# Patient Record
Sex: FEMALE | Race: OTHER | URBAN - METROPOLITAN AREA
[De-identification: names, ages, dates, MRNs, and addresses within clinical notes are randomized per-mention and may not be internally consistent; named-entity substitution may affect disease eponyms.]

---

## 2022-10-18 ENCOUNTER — HOSPITAL ENCOUNTER (OUTPATIENT)
Dept: LAB | Age: 22
Discharge: HOME | End: 2022-10-18

## 2022-10-18 ENCOUNTER — OFFICE VISIT (OUTPATIENT)
Dept: FAMILY MEDICINE CLINIC | Age: 22
End: 2022-10-18

## 2022-10-18 VITALS
HEIGHT: 59 IN | WEIGHT: 136 LBS | BODY MASS INDEX: 27.42 KG/M2 | DIASTOLIC BLOOD PRESSURE: 70 MMHG | TEMPERATURE: 98.8 F | OXYGEN SATURATION: 99 % | HEART RATE: 63 BPM | SYSTOLIC BLOOD PRESSURE: 116 MMHG

## 2022-10-18 DIAGNOSIS — R53.83 OTHER FATIGUE: ICD-10-CM

## 2022-10-18 DIAGNOSIS — R53.83 OTHER FATIGUE: Primary | ICD-10-CM

## 2022-10-18 LAB — HGB BLD-MCNC: 13.9 G/DL

## 2022-10-18 PROCEDURE — 80053 COMPREHEN METABOLIC PANEL: CPT

## 2022-10-18 PROCEDURE — 85018 HEMOGLOBIN: CPT | Performed by: NURSE PRACTITIONER

## 2022-10-18 PROCEDURE — 84703 CHORIONIC GONADOTROPIN ASSAY: CPT

## 2022-10-18 PROCEDURE — 36415 COLL VENOUS BLD VENIPUNCTURE: CPT

## 2022-10-18 PROCEDURE — 85025 COMPLETE CBC W/AUTO DIFF WBC: CPT

## 2022-10-18 PROCEDURE — 99214 OFFICE O/P EST MOD 30 MIN: CPT | Performed by: NURSE PRACTITIONER

## 2022-10-18 NOTE — PROGRESS NOTES
10/18/2022 : Patty Colvin Rose [place] (: 2000) is a 23 y.o. female,  new patient, here for evaluation of the following chief complaint(s):  Chest Pain (Accompanied with sometimes having SOB and fatigue. These symptoms started about 1 year ago.)     ASSESSMENT/PLAN:  Below is the assessment and plan developed based on review of pertinent history, physical exam, labs, studies, and medications. 1. Other fatigue  -     AMB POC HEMOGLOBIN (HGB)  -     HCG QL SERUM; Future  -     CBC WITH AUTOMATED DIFF; Future  -     METABOLIC PANEL, COMPREHENSIVE; Future  Drink water for now. Labs. Return for schedule 1 month LK F2F. SUBJECTIVE/OBJECTIVE:  HPI   History of anemia? No  + food insecurity  All of a sudden it is painful. If she drinks cold water it goes away. It is a problem both day and night. Results for orders placed or performed in visit on 10/18/22   AMB POC HEMOGLOBIN (HGB)   Result Value Ref Range    Hemoglobin (POC) 13.9 G/DL   Also pain left upper shoulder. Muscular. For the last 2 months it's getting worse. She is here on suggestion of her aunt. No nausea, vomiting;ut when she eats she feels like the food goes up her chest and \"feels like \" she \"needs to throw up\". No diarrhea  + constipation, no blood  Has a child, boy, age 10. No work for 2 months. Before this she worked in [place]. Pain behind right shoulder is not related to chest pain. Review of Systems: Negative for: fever, leg swelling. Social History:  reports that she has never smoked. She has never used smokeless tobacco. She reports that she does not currently use alcohol. She reports that she does not use drugs. Current Medications:   No current outpatient medications on file. Physical Examination: Patient's last menstrual period was 10/02/2022 (exact date).   Ended on ; no period yet   Blood pressure 116/70, pulse 63, temperature 98.8 °F (37.1 °C), temperature source Temporal, height 4' 10.86\" (1.495 m), weight 136 lb (61.7 kg), last menstrual period 10/02/2022, SpO2 99 %. General appearance - well developed, no acute distress. Chest - clear to auscultation. Heart - regular rate and rhythm without murmurs, rubs, or gallops. Abdomen - bowel sounds present x 4, NT, ND  Extremities - no CCE. CN II-XII intact. An electronic signature was used to authenticate this note.   -- Lickingvillegarth Kilgore, NP

## 2022-10-18 NOTE — PROGRESS NOTES
I have printed AVS and reviewed it with patient today. Patient verbalized understanding. I instructed the patient that he will receive a call to review her lab results. Patient verbalized understanding. The patient was given food and housing resources to use as needed. Patient verbalized understanding. Patient correctly stated her full name and date of birth prior to the information shared.  [id number] with the [facility] assisted with this discharge.   Mary Jane Espinal RN

## 2022-10-18 NOTE — PROGRESS NOTES
Chief Complaint   Patient presents with    Chest Pain     Accompanied with sometimes having SOB and fatigue. These symptoms started about 1 year ago.      Visit Vitals  /70 (BP 1 Location: Right arm, BP Patient Position: Sitting, BP Cuff Size: Adult)   Pulse 63   Temp 98.8 °F (37.1 °C) (Temporal)   Ht 4' 10.86\" (1.495 m)   Wt 136 lb (61.7 kg)   LMP 10/02/2022 (Exact Date)   SpO2 99%   BMI 27.60 kg/m²

## 2022-10-19 LAB
ALBUMIN SERPL-MCNC: 4 G/DL (ref 3.5–5)
ALBUMIN/GLOB SERPL: 1 {RATIO} (ref 1.1–2.2)
ALP SERPL-CCNC: 62 U/L (ref 45–117)
ALT SERPL-CCNC: 30 U/L (ref 12–78)
ANION GAP SERPL CALC-SCNC: 4 MMOL/L (ref 5–15)
AST SERPL-CCNC: 20 U/L (ref 15–37)
BASOPHILS # BLD: 0 K/UL (ref 0–0.1)
BASOPHILS NFR BLD: 0 % (ref 0–1)
BILIRUB SERPL-MCNC: 0.2 MG/DL (ref 0.2–1)
BUN SERPL-MCNC: 11 MG/DL (ref 6–20)
BUN/CREAT SERPL: 18 (ref 12–20)
CALCIUM SERPL-MCNC: 8.9 MG/DL (ref 8.5–10.1)
CHLORIDE SERPL-SCNC: 105 MMOL/L (ref 97–108)
CO2 SERPL-SCNC: 28 MMOL/L (ref 21–32)
CREAT SERPL-MCNC: 0.62 MG/DL (ref 0.55–1.02)
DIFFERENTIAL METHOD BLD: NORMAL
EOSINOPHIL # BLD: 0.3 K/UL (ref 0–0.4)
EOSINOPHIL NFR BLD: 4 % (ref 0–7)
ERYTHROCYTE [DISTWIDTH] IN BLOOD BY AUTOMATED COUNT: 12.2 % (ref 11.5–14.5)
GLOBULIN SER CALC-MCNC: 3.9 G/DL (ref 2–4)
GLUCOSE SERPL-MCNC: 106 MG/DL (ref 65–100)
HCG SERPL QL: NEGATIVE
HCT VFR BLD AUTO: 41 % (ref 35–47)
HGB BLD-MCNC: 13.3 G/DL (ref 11.5–16)
IMM GRANULOCYTES # BLD AUTO: 0 K/UL (ref 0–0.04)
IMM GRANULOCYTES NFR BLD AUTO: 0 % (ref 0–0.5)
LYMPHOCYTES # BLD: 1.9 K/UL (ref 0.8–3.5)
LYMPHOCYTES NFR BLD: 27 % (ref 12–49)
MCH RBC QN AUTO: 29.7 PG (ref 26–34)
MCHC RBC AUTO-ENTMCNC: 32.4 G/DL (ref 30–36.5)
MCV RBC AUTO: 91.5 FL (ref 80–99)
MONOCYTES # BLD: 0.5 K/UL (ref 0–1)
MONOCYTES NFR BLD: 8 % (ref 5–13)
NEUTS SEG # BLD: 4.2 K/UL (ref 1.8–8)
NEUTS SEG NFR BLD: 61 % (ref 32–75)
NRBC # BLD: 0 K/UL (ref 0–0.01)
NRBC BLD-RTO: 0 PER 100 WBC
PLATELET # BLD AUTO: 260 K/UL (ref 150–400)
PMV BLD AUTO: 11.7 FL (ref 8.9–12.9)
POTASSIUM SERPL-SCNC: 3.6 MMOL/L (ref 3.5–5.1)
PROT SERPL-MCNC: 7.9 G/DL (ref 6.4–8.2)
RBC # BLD AUTO: 4.48 M/UL (ref 3.8–5.2)
SODIUM SERPL-SCNC: 137 MMOL/L (ref 136–145)
WBC # BLD AUTO: 6.9 K/UL (ref 3.6–11)